# Patient Record
(demographics unavailable — no encounter records)

---

## 2024-10-09 NOTE — ASSESSMENT
[FreeTextEntry1] : 72 Y OLD MALE WITH GIL = MONTELUKAST 10 MG ORDERED HTN ,IFG ,DYSLIPIDEMIA = LABS  MGUS ,CNS LYMPHOMA = F/U HEM-ONC  RTO 3 M

## 2024-10-14 NOTE — ASSESSMENT
[FreeTextEntry1] : 72-year-old man, with a past medical history of hypertension, B-cell lymphoma, thalassemia minor, and BPH, presenting for follow-up.  PVCs: Prior to his initial visit with me, patient presented to his primary care physician, Dr. Rahman, for an annual wellness visit. At that time, and ECG elucidated frequent PVCs. Given this, he underwent a TTE that demonstrated a normal left ventricular systolic function (ejection fraction of 63%), and mild MR; which was grossly unchanged from a prior study on 4/1/21. The patient presently denies CP, SOB/GALARZA, palpitations, dizziness/LOC, PND, orthopnea, HAs, abdominal pain, and LE swelling. He is able to ambulate >10 blocks and >2 flights of stairs without inhibition by chest pain or dyspnea on exertion. TFTs reviewed and unremarkable. TTE with normal LV EF, so PVC burden has not been chronically significant enough to weaken the systolic function. MCT from 5/1-13/24 reviewed showing an underlying sinus rhythm at an average rate of 90 bpm, without AFib/Flutter, and no pauses. However, he did demonstrate a PVC burden of >20%, and had one episode of 3 beats of NSVT. - given significant ventricular ectopic burden, patient referred to Electrophysiology, though he is deferring at this time as he sees multiple providers for current malignancy   - continue labetalol 100mg PO BID  - patient instructed to contact me and/or seek immediate medical attention if symptoms present  HTN: BP better controlled but still elevated at this time. He notes having been put on 50mg of losartan and feeling "unlike (himself)" in the past; therefore, suspicion is that patient's BP is better controlled at home and an increased dose of his antihypertensive regiment causes relative hypotension.   - continue labetalol 100mg PO BID (as above) - continue losartan 25mg PO QDay - patient to keep home BP log and will adjust regimen based on daily average, with plan to convert losartan to agent with different mechanism of action if need for increased dose  HLD: Latest lipid profile on 7/10/24 with a , HDL 38, , and ; prior on 3/11/24 with a TC of 209, HDL 40, , and . - deferring medical therapy at this time in favor of lifestyle interventions (diet and exercise) - weight loss counseling - increase aerobic exercise as tolerated to aim for approximately 30 minutes of activity 5 days a week - heart healthy diet low in sodium, sugars and saturated fats and high in fruits, vegetables and whole grains - will reassess lipid panel in 6 months after consistent lifestyle modification, and readdress potential medical therapy if there is no meaningful change in the lipid panel at that time  F/u in 3 moths for BP.

## 2024-10-14 NOTE — CARDIOLOGY SUMMARY
[de-identified] : 10/14/24: NSR with ventricular bigeminy at 85 bpm 3/11/24: NSR at 92 bpm, PVCs, low voltage in limb leads   [de-identified] : MCT 5/1-13/24: - 1. Underlying rhythm = Sinus. 2. Afib/flutter burden = 0 %. 3. Pause(s) > 2.5 sec = 0. 4. PVC(s) = 859536 with 3 QRS form(s). 1 Ventricular ectopic event(s) 196 bpm, 3 beats, on Day 8 /11:40:43 pm. 5. PAC(s)/PSVC(s) = 179; 1 SV ectopic event(s): 170 bpm, 4 beats, on Day 3 / 12:26:28 am. 6. No AV block noted. 7. Patient marker count = 0. 8. Diary none. [de-identified] : TTE 4/1/24: 1. Technically difficult image quality. 2. Left ventricular systolic function is normal with an ejection fraction of 63 % by Fernandez's method of disks. 3. The left atrium is mildly dilated. 4. There is calcification of the aortic valve leaflets. Mild aortic stenosis with a noncoronary cusp that appears restricted in its motion. 5. Mildly enlarged right ventricular cavity size and normal systolic function. 6. Estimated pulmonary artery systolic pressure is 27 mmHg, consistent with normal pulmonary artery pressure. 7. Compared to the transthoracic echocardiogram performed on 4/1/2021, there have been no significant interval changes.

## 2024-10-14 NOTE — PHYSICAL EXAM
[No Acute Distress] : no acute distress [Normal Venous Pressure] : normal venous pressure [No Carotid Bruit] : no carotid bruit [No Rub] : no rub [No Murmur] : no murmur [No Gallop] : no gallop [Clear Lung Fields] : clear lung fields [Good Air Entry] : good air entry [No Respiratory Distress] : no respiratory distress  [Soft] : abdomen soft [Non Tender] : non-tender [No Masses/organomegaly] : no masses/organomegaly [Normal Bowel Sounds] : normal bowel sounds [Normal Gait] : normal gait [No Edema] : no edema [No Cyanosis] : no cyanosis [No Clubbing] : no clubbing [No Varicosities] : no varicosities [Moves all extremities] : moves all extremities [No Focal Deficits] : no focal deficits [Normal Speech] : normal speech [Alert and Oriented] : alert and oriented [Normal memory] : normal memory [de-identified] : Irregular (frequent PVCs)

## 2024-10-14 NOTE — HISTORY OF PRESENT ILLNESS
[FreeTextEntry1] : 72-year-old man, with a past medical history of hypertension, B-cell lymphoma, thalassemia minor, and benign prostatic hyperplasia, presenting for follow-up. Prior to his initial visit with me, patient presented to his primary care physician, Dr. Rahman, for an annual wellness visit. At that time, and ECG elucidated frequent PVCs. Given this, he underwent a TTE that demonstrated a normal left ventricular systolic function (ejection fraction of 63%), and mild MR; which was grossly unchanged from a prior study on 4/1/21. The patient presently denies chest pain, shortness of breath/dyspnea on exertion, palpitations, dizziness/loss of consciousness, paroxysmal nocturnal dyspnea, orthopnea, headaches, abdominal pain, and lower extremity swelling. He is able to ambulate >10 blocks and >2 flights of stairs without inhibition by chest pain or dyspnea on exertion.

## 2025-01-17 NOTE — PHYSICAL EXAM
[No Acute Distress] : no acute distress [Normal Sclera/Conjunctiva] : normal sclera/conjunctiva [Normal Outer Ear/Nose] : the outer ears and nose were normal in appearance [No JVD] : no jugular venous distention [Normal] : normal rate, regular rhythm, normal S1 and S2 and no murmur heard [No Edema] : there was no peripheral edema [Soft] : abdomen soft [Non Tender] : non-tender [Normal Bowel Sounds] : normal bowel sounds [Normal Anterior Cervical Nodes] : no anterior cervical lymphadenopathy [No Rash] : no rash [Coordination Grossly Intact] : coordination grossly intact [No Focal Deficits] : no focal deficits [Normal Affect] : the affect was normal

## 2025-01-17 NOTE — ASSESSMENT
[FreeTextEntry1] : 72 Y OLD MALE WITH HTN = CONTINUE LOSARTAN 25 AND LABETALOL 50 MG BID  DYSLIPIDEMIA AND IFG = LABS  B CELL LYMPHOMA  WITH CNS INVOLVEMENT = F/U NEURO AND HEM-ONC  RTO 3 M  KEEP WALKING 2-3 MILES A DAY

## 2025-04-21 NOTE — PHYSICAL EXAM
[No Acute Distress] : no acute distress [Normal Sclera/Conjunctiva] : normal sclera/conjunctiva [Normal Outer Ear/Nose] : the outer ears and nose were normal in appearance [No JVD] : no jugular venous distention [Scattered Wheezes] : scattered wheezing was heard [Normal Rate] : normal rate  [Regular Rhythm] : with a regular rhythm [No Edema] : there was no peripheral edema [Non Tender] : non-tender [Normal Bowel Sounds] : normal bowel sounds [Normal Anterior Cervical Nodes] : no anterior cervical lymphadenopathy [No CVA Tenderness] : no CVA  tenderness [No Rash] : no rash [Coordination Grossly Intact] : coordination grossly intact [No Focal Deficits] : no focal deficits [Normal Affect] : the affect was normal

## 2025-04-21 NOTE — ASSESSMENT
[FreeTextEntry1] : CPE OF 72 Y OLD MALE WITH PMX OF HTN ,MGUS ,B CELL CNC LYMPHOMA  ON REMISSION = LABS TODAY  RTO 3 M  RECOMM [facility]IZAB[facility] CRABTREE  [Vaccines Reviewed] : Immunizations reviewed today. Please see immunization details in the vaccine log within the immunization flowsheet.

## 2025-04-21 NOTE — HISTORY OF PRESENT ILLNESS
[de-identified] : SEEN BY HEM-ONC SINCE LAST VISIT AND OFF TREATMENT ,"PRAKASH" CC OF PERSISTENT DAILY DRY COUGH FOR MONTHS

## 2025-04-21 NOTE — HEALTH RISK ASSESSMENT
[Good] : ~his/her~  mood as  good [Monthly or less (1 pt)] : Monthly or less (1 point) [1 or 2 (0 pts)] : 1 or 2 (0 points) [Never (0 pts)] : Never (0 points) [No] : In the past 12 months have you used drugs other than those required for medical reasons? No [No falls in past year] : Patient reported no falls in the past year [Former] : Former [Alone] : lives alone [Retired] : retired [] :  [# Of Children ___] : has [unfilled] children [Sexually Active] : sexually active [Feels Safe at Home] : Feels safe at home [de-identified] : QUIT 20+ YEARS AGO [ColonoscopyDate] : 11/20

## 2025-06-10 NOTE — CONSULT LETTER
[Dear  ___] : Dear  [unfilled], [Courtesy Letter:] : I had the pleasure of seeing your patient, [unfilled], in my office today. [Please see my note below.] : Please see my note below. [Consult Closing:] : Thank you very much for allowing me to participate in the care of this patient.  If you have any questions, please do not hesitate to contact me. [Sincerely,] : Sincerely, [FreeTextEntry3] : MICHAEL WILLINGHAM MD  [address], [address] [place] [address]    [DrLibrado  ___] : Dr. ORTIZ

## 2025-06-10 NOTE — HISTORY OF PRESENT ILLNESS
[Former] : former [TextBox_4] : 72-year-old male with history of lymphoma with brain mets in 2021, presents for evaluation of abnormal chest CT.  Patient complains of productive cough over the last year, for which he was recently treated with oral steroids and antibiotics, with some improvement. He denies fever, chills, chest pain, hemoptysis, night sweats or weight loss.  He denies prior pulmonary treatment or problems. [YearQuit] : 2005 [TextBox_29] : Denies snoring, daytime somnolence, apneic episodes, AM headaches

## 2025-06-10 NOTE — DISCUSSION/SUMMARY
[FreeTextEntry1] : 72-year-old male with history of chronic cough, abnormal chest CT, with OAD on PFTs.  I reviewed the PFT results with the patient and his son who was present throughout.  Albuterol MDI for occasional use was prescribed.  Treatment adjustment will depend on symptomatic needs.  I also reviewed the chest CT images online and discussed findings with both.  The abnormalities noted appear to be inflammatory in nature.  A repeat study will be performed in 3 months.  He is to follow-up with his oncologist and PMD as before.

## 2025-06-10 NOTE — REVIEW OF SYSTEMS
[Cough] : cough [Hemoptysis] : no hemoptysis [Sputum] : sputum [Dyspnea] : no dyspnea [Negative] : Endocrine

## 2025-06-10 NOTE — HISTORY OF PRESENT ILLNESS
[FreeTextEntry1] :   KINGSTON YAN Jul 19 1952   Language: English Date of First visit: 06/10/2025 Accompanied by: self Contact info: Referring Provider/PCP: Dr. Rahman  Fax: tw   CC/ Problem List: elevated PSA  =============================================================================== FIRST VISIT / Summary: Very pleasant 72 year old M previously seen for elevated PSA. Diagnosed with CNS lymphoma in 2021, started treatment with Rituximab and had a dramatic rise in PSA. Completed Rituximab 2/2025. Had prostate MRI 5/2022 (see below). ED complaints but no urinary complaints.    ------------------------------------------------------------------------------------------- INTERVAL VISITS: The patient's allergies and medications were reviewed and edited below. Dated 06/10/2025  Here for elevated PSA 22.30 4/2025 more than doubled from 3/2024. Elevation was around time of taking rituximab. Sees Dr. Spencer at [phone]  ===============================================================================   PMH: lymphoma s/p chemo  FHx: SocHx:   PSH:   ROS: Review of Systems is as per HPI unless otherwise denoted below   =============================================================================== DATA: LABS (SELECTED):--------------------------------------------------------------------------------------------------- 3/19/2021 4K score 8 1/3/2022 PSA 3.46 1/4/2023 PSA 11.10 2/17/2023 4K score 7.7 3/11/2024 PSA 9.99 4/21/2025 PSA 22.30, Cr 1.16, A1c 5.8   RADS:------------------------------------------------------------------------------------------------------------------- 5/10/2022 MRI prostate with and without contrast ([facility])- No focal lesion suspicious for significant prostate cancer. Benign prostatic hypertrophy with volume of 54cc. PIRADS 2   PATHOLOGY/CYTOLOGY:-------------------------------------------------------------------------------------------     VOIDING STUDIES: ----------------------------------------------------------------------------------------------------     STONE STUDIES: (Analysis/LLSA)----------------------------------------------------------------------------------     PROCEDURES: -----------------------------------------------------------------------------------------------       =============================================================================== PHYSICAL EXAM:    FOCUSED: ----------------------------------------------------------------------------------------------------------------     ======================================================================================= DISCUSSION: ======================================================================================= ASSESSMENT and PLAN   1. Elevated PSA - continued increase, rec MRI of prostate followed by targeted biopsy - PSA and urine cx - RTC in 1 month   =======================================================================================  4g Thank you for allowing me to assist in the care of your patient. Should you have any questions please do not hesitate to reach out to me.     Sudhir Renae MD                                                    [facility] Physician [facility]   [place] Office: [address], [address] [place] [address] T: [phone] F: [phone]   [place] Office: [address], [address] [place] [address] T: [phone] F: [phone]

## 2025-06-10 NOTE — CONSULT LETTER
[Dear  ___] : Dear  [unfilled], [Courtesy Letter:] : I had the pleasure of seeing your patient, [unfilled], in my office today. [Please see my note below.] : Please see my note below. [Consult Closing:] : Thank you very much for allowing me to participate in the care of this patient.  If you have any questions, please do not hesitate to contact me. [Sincerely,] : Sincerely, [FreeTextEntry3] : MICHAEL WILLINGHAM MD  [address], [address] [place] [address]    [DrLibrado  ___] : Dr. ORTIZ

## 2025-06-10 NOTE — HISTORY OF PRESENT ILLNESS
[FreeTextEntry1] :   KINGSTON YAN Jul 19 1952   Language: English Date of First visit: 06/10/2025 Accompanied by: self Contact info: Referring Provider/PCP: Dr. Rahman  Fax: tw   CC/ Problem List: elevated PSA  =============================================================================== FIRST VISIT / Summary: Very pleasant 72 year old M previously seen for elevated PSA. Diagnosed with CNS lymphoma in 2021, started treatment with Rituximab and had a dramatic rise in PSA. Completed Rituximab 2/2025. Had prostate MRI 5/2022 (see below). ED complaints but no urinary complaints.    ------------------------------------------------------------------------------------------- INTERVAL VISITS: The patient's allergies and medications were reviewed and edited below. Dated 06/10/2025  Here for elevated PSA 22.30 4/2025 more than doubled from 3/2024. Elevation was around time of taking rituximab. Sees Dr. Spencer at [phone]  ===============================================================================   PMH: lymphoma s/p chemo  FHx: SocHx:   PSH:   ROS: Review of Systems is as per HPI unless otherwise denoted below   =============================================================================== DATA: LABS (SELECTED):--------------------------------------------------------------------------------------------------- 3/19/2021 4K score 8 1/3/2022 PSA 3.46 1/4/2023 PSA 11.10 2/17/2023 4K score 7.7 3/11/2024 PSA 9.99 4/21/2025 PSA 22.30, Cr 1.16, A1c 5.8   RADS:------------------------------------------------------------------------------------------------------------------- 5/10/2022 MRI prostate with and without contrast (HIE)- No focal lesion suspicious for significant prostate cancer. Benign prostatic hypertrophy with volume of 54cc. PIRADS 2   PATHOLOGY/CYTOLOGY:-------------------------------------------------------------------------------------------     VOIDING STUDIES: ----------------------------------------------------------------------------------------------------     STONE STUDIES: (Analysis/LLSA)----------------------------------------------------------------------------------     PROCEDURES: -----------------------------------------------------------------------------------------------       =============================================================================== PHYSICAL EXAM:    FOCUSED: ----------------------------------------------------------------------------------------------------------------     ======================================================================================= DISCUSSION: ======================================================================================= ASSESSMENT and PLAN   1. Elevated PSA - continued increase, rec MRI of prostate followed by targeted biopsy - PSA and urine cx - RTC in 1 month   =======================================================================================  4g Thank you for allowing me to assist in the care of your patient. Should you have any questions please do not hesitate to reach out to me.     Sudhir Renae MD                                                    [facility] Physician [facility] for [facility]   [place] Office: [address], [address] [place] [address] T: [phone] F: [phone]   [place] Office: [address] [place] [address] T: [phone] F: [phone]